# Patient Record
Sex: FEMALE | Race: WHITE | Employment: OTHER | URBAN - NONMETROPOLITAN AREA
[De-identification: names, ages, dates, MRNs, and addresses within clinical notes are randomized per-mention and may not be internally consistent; named-entity substitution may affect disease eponyms.]

---

## 2017-01-23 ENCOUNTER — NURSE TRIAGE (OUTPATIENT)
Dept: ADMINISTRATIVE | Age: 82
End: 2017-01-23

## 2017-12-06 ENCOUNTER — NURSE TRIAGE (OUTPATIENT)
Dept: ADMINISTRATIVE | Age: 82
End: 2017-12-06

## 2017-12-07 NOTE — TELEPHONE ENCOUNTER
I returned a call to [facility] to see if she was able to contact a pharmacist about her previous call where she did not take her heart medication this morning. She says that she was able to get ahold of them and they told her to take her night meds and resume normal medication regime in the morning. They told her to put her feet up and take her B/P. She tells me that it is 187/76. She is now having mid chest pain and has a hx of previous MI.  Reason for Disposition   [1] Chest pain lasts > 5 minutes AND [2] history of heart disease  (i.e., heart attack, bypass surgery, angina, angioplasty, CHF; not just a heart murmur)    Answer Assessment - Initial Assessment Questions  1. LOCATION: \"Where does it hurt? \"    Mid chest pain  2. RADIATION: \"Does the pain go anywhere else? \" (e.g., into neck, jaw, arms, back)      Pain does not radiate  3. ONSET: \"When did the chest pain begin? \" (Minutes, hours or days)       This afternoon  4. PATTERN \"Does the pain come and go, or has it been constant since it started? \"  \"Does it get worse with exertion? \"       Pain is constant  5. DURATION: \"How long does it last\" (e.g., seconds, minutes, hours)      Has been lasting for the past hour  6. SEVERITY: \"How bad is the pain? \"  (e.g., Scale 1-10; mild, moderate, or severe)     - MILD (1-3): doesn't interfere with normal activities      - MODERATE (4-7): interferes with normal activities or awakens from sleep     - SEVERE (8-10): excruciating pain, unable to do any normal activities        Moderate at a six  7. CARDIAC RISK FACTORS: \"Do you have any history of heart problems or risk factors for heart disease? \" (e.g., prior heart attack, angina; high blood pressure, diabetes, being overweight, high cholesterol, smoking, or strong family history of heart disease)      High blood pressure/pacemaker/heart attack/bypass  8. PULMONARY RISK FACTORS: \"Do you have any history of lung disease? \"  (e.g., blood clots in lung, asthma, emphysema, birth

## 2017-12-29 ENCOUNTER — NURSE TRIAGE (OUTPATIENT)
Dept: ADMINISTRATIVE | Age: 82
End: 2017-12-29

## 2017-12-30 NOTE — TELEPHONE ENCOUNTER
She hung up before I could get any information about what is going on.         No answer, unable to leave message

## 2018-01-07 ENCOUNTER — NURSE TRIAGE (OUTPATIENT)
Dept: ADMINISTRATIVE | Age: 83
End: 2018-01-07

## 2018-01-22 ENCOUNTER — NURSE TRIAGE (OUTPATIENT)
Dept: ADMINISTRATIVE | Age: 83
End: 2018-01-22

## 2018-02-13 ENCOUNTER — NURSE TRIAGE (OUTPATIENT)
Dept: ADMINISTRATIVE | Age: 83
End: 2018-02-13

## 2018-03-25 ENCOUNTER — NURSE TRIAGE (OUTPATIENT)
Dept: ADMINISTRATIVE | Age: 83
End: 2018-03-25

## 2018-10-05 ENCOUNTER — NURSE TRIAGE (OUTPATIENT)
Dept: ADMINISTRATIVE | Age: 83
End: 2018-10-05